# Patient Record
Sex: MALE | Race: BLACK OR AFRICAN AMERICAN | NOT HISPANIC OR LATINO | URBAN - METROPOLITAN AREA
[De-identification: names, ages, dates, MRNs, and addresses within clinical notes are randomized per-mention and may not be internally consistent; named-entity substitution may affect disease eponyms.]

---

## 2024-09-02 ENCOUNTER — APPOINTMENT (OUTPATIENT)
Dept: RADIOLOGY | Facility: HOSPITAL | Age: 69
End: 2024-09-02
Payer: MEDICARE

## 2024-09-02 ENCOUNTER — HOSPITAL ENCOUNTER (EMERGENCY)
Facility: HOSPITAL | Age: 69
Discharge: HOME | End: 2024-09-02
Attending: EMERGENCY MEDICINE
Payer: MEDICARE

## 2024-09-02 VITALS
RESPIRATION RATE: 18 BRPM | BODY MASS INDEX: 31.5 KG/M2 | HEART RATE: 70 BPM | WEIGHT: 225 LBS | HEIGHT: 71 IN | TEMPERATURE: 98.6 F | OXYGEN SATURATION: 98 % | DIASTOLIC BLOOD PRESSURE: 80 MMHG | SYSTOLIC BLOOD PRESSURE: 157 MMHG

## 2024-09-02 DIAGNOSIS — U07.1 COVID-19: Primary | ICD-10-CM

## 2024-09-02 LAB
FLUAV RNA RESP QL NAA+PROBE: NOT DETECTED
FLUBV RNA RESP QL NAA+PROBE: NOT DETECTED
S PYO DNA THROAT QL NAA+PROBE: NOT DETECTED
SARS-COV-2 RNA RESP QL NAA+PROBE: DETECTED

## 2024-09-02 PROCEDURE — 87651 STREP A DNA AMP PROBE: CPT

## 2024-09-02 PROCEDURE — 87502 INFLUENZA DNA AMP PROBE: CPT | Performed by: EMERGENCY MEDICINE

## 2024-09-02 PROCEDURE — 71046 X-RAY EXAM CHEST 2 VIEWS: CPT | Mod: FOREIGN READ | Performed by: RADIOLOGY

## 2024-09-02 PROCEDURE — 99283 EMERGENCY DEPT VISIT LOW MDM: CPT | Mod: 25

## 2024-09-02 PROCEDURE — 71046 X-RAY EXAM CHEST 2 VIEWS: CPT

## 2024-09-02 ASSESSMENT — COLUMBIA-SUICIDE SEVERITY RATING SCALE - C-SSRS
2. HAVE YOU ACTUALLY HAD ANY THOUGHTS OF KILLING YOURSELF?: NO
1. IN THE PAST MONTH, HAVE YOU WISHED YOU WERE DEAD OR WISHED YOU COULD GO TO SLEEP AND NOT WAKE UP?: NO
6. HAVE YOU EVER DONE ANYTHING, STARTED TO DO ANYTHING, OR PREPARED TO DO ANYTHING TO END YOUR LIFE?: NO

## 2024-09-02 NOTE — ED PROVIDER NOTES
HPI   Chief Complaint   Patient presents with    Flu Symptoms       HPI  Nader Murray is a 68-year-old male with history of hypertension presented to the ED with cough and sore throat that began Thursday.  Patient states his symptoms have been worsening.  Patient states the cough is productive and is coughing up yellow sputum.  Patient states he has still been able to swallow and denies drooling.  Patient states he is in town visiting his daughter and the  of his car tested positive for COVID.  Patient denies chest pain, shortness of breath, abdominal pain, nausea, vomiting, fevers, body aches, chills, headache.      Patient History   No past medical history on file.  No past surgical history on file.  No family history on file.  Social History     Tobacco Use    Smoking status: Not on file    Smokeless tobacco: Not on file   Substance Use Topics    Alcohol use: Not on file    Drug use: Not on file       Physical Exam   ED Triage Vitals [09/02/24 0851]   Temperature Heart Rate Respirations BP   37 °C (98.6 °F) 70 18 157/80      Pulse Ox Temp Source Heart Rate Source Patient Position   98 % Temporal Monitor Sitting      BP Location FiO2 (%)     Right arm --       Physical Exam  Vitals and nursing note reviewed.   Constitutional:       Appearance: Normal appearance.   HENT:      Mouth/Throat:      Pharynx: Uvula midline. Posterior oropharyngeal erythema present. No pharyngeal swelling or oropharyngeal exudate.      Tonsils: No tonsillar exudate or tonsillar abscesses.   Cardiovascular:      Rate and Rhythm: Normal rate and regular rhythm.      Heart sounds: No murmur heard.     No gallop.   Pulmonary:      Effort: Pulmonary effort is normal. No respiratory distress.      Breath sounds: Normal breath sounds. No stridor. No wheezing, rhonchi or rales.   Chest:      Chest wall: No tenderness.   Abdominal:      General: Abdomen is flat. Bowel sounds are normal. There is no distension.      Palpations: Abdomen  is soft. There is no mass.      Tenderness: There is no abdominal tenderness. There is no guarding or rebound.      Hernia: No hernia is present.   Skin:     General: Skin is warm and dry.   Neurological:      General: No focal deficit present.      Mental Status: He is alert and oriented to person, place, and time.   Psychiatric:         Mood and Affect: Mood normal.         Behavior: Behavior normal.           ED Course & MDM   Diagnoses as of 09/02/24 1243   COVID-19                 No data recorded     [place] Coma Scale Score: 15 (09/02/24 0850 : Sunitha Gallardo RN)                           Medical Decision Making  This is a 68-year-old male presenting to the ED with productive cough cough and sore throat that began Thursday and have been worsening.  Patient states he is visiting his daughter from out of state and the  of his car tested positive for COVID.  On exam the posterior oropharynx is erythematous but no tonsillar exudates or peritonsillar abscess.  No muffled voice or drooling.  Strep test was obtained today to evaluate for strep pharyngitis causing patient's sore throat.  And test is negative.  Influenza and COVID swabs were obtained as well to determine the etiology of the patient's symptoms.  Influenza test is negative however COVID test is positive.  Discussed with patient that he does have COVID.  Lungs are clear to auscultation bilaterally and patient is breathing comfortably however chest x-ray was obtained today with concern for pneumonia given the patient's productive cough.  Chest x-ray shows minimal segmental atelectasis of the right midlung but no concerns for pneumonia.  Patient has remained hemodynamically stable and the emergency department today.    Disposition: Discharge home  Patient was advised to follow-up with primary care provider as needed.  Strict return precautions were given.  Plan was discussed with the patient and the patient understands and agrees.  Patient was  discharged in stable condition.    Patient was discussed and staffed with Dr. Conway    Procedure  Procedures     Corona Caruso PA-C  09/03/24 1221       Corona Caruso PA-C  09/03/24 2007

## 2024-09-02 NOTE — ED TRIAGE NOTES
Pt presents to the ed for flu like symptoms. Pt complaining of a 4 day history of headache, sore throat and dry cough

## 2024-09-02 NOTE — DISCHARGE INSTRUCTIONS
Your symptoms are due to COVID-19 infection.  Follow-up with your primary care provider as needed.  Return to the emergency department if your cough worsens, you develop shortness breath, develop chest pain, have generalized weakness, or any new symptoms began.